# Patient Record
Sex: FEMALE | Race: BLACK OR AFRICAN AMERICAN | NOT HISPANIC OR LATINO | Employment: FULL TIME | URBAN - METROPOLITAN AREA
[De-identification: names, ages, dates, MRNs, and addresses within clinical notes are randomized per-mention and may not be internally consistent; named-entity substitution may affect disease eponyms.]

---

## 2017-03-27 ENCOUNTER — OFFICE VISIT (OUTPATIENT)
Dept: OBSTETRICS AND GYNECOLOGY | Facility: CLINIC | Age: 32
End: 2017-03-27
Payer: COMMERCIAL

## 2017-03-27 VITALS
WEIGHT: 284.38 LBS | BODY MASS INDEX: 38.52 KG/M2 | SYSTOLIC BLOOD PRESSURE: 122 MMHG | DIASTOLIC BLOOD PRESSURE: 78 MMHG | HEIGHT: 72 IN

## 2017-03-27 DIAGNOSIS — Z01.419 ENCOUNTER FOR GYNECOLOGICAL EXAMINATION WITHOUT ABNORMAL FINDING: ICD-10-CM

## 2017-03-27 DIAGNOSIS — Z90.710 STATUS POST HYSTERECTOMY: ICD-10-CM

## 2017-03-27 DIAGNOSIS — N64.4 BREAST PAIN: ICD-10-CM

## 2017-03-27 DIAGNOSIS — Z00.00 ANNUAL PHYSICAL EXAM: Primary | ICD-10-CM

## 2017-03-27 PROCEDURE — 99999 PR PBB SHADOW E&M-EST. PATIENT-LVL III: CPT | Mod: PBBFAC,,, | Performed by: OBSTETRICS & GYNECOLOGY

## 2017-03-27 PROCEDURE — 99395 PREV VISIT EST AGE 18-39: CPT | Mod: S$GLB,,, | Performed by: OBSTETRICS & GYNECOLOGY

## 2017-03-27 PROCEDURE — 1160F RVW MEDS BY RX/DR IN RCRD: CPT | Mod: S$GLB,,, | Performed by: OBSTETRICS & GYNECOLOGY

## 2017-03-27 NOTE — MR AVS SNAPSHOT
"    [facility]  [address]  [place] [address]  Phone: [phone]                  Scot Mcdonald   3/27/2017 1:15 PM   Office Visit    Description:  Female : 1985   Provider:  Livan Servin MD   Department[facility]  [facility]           Reason for Visit     Gynecologic Exam           Diagnoses this Visit        Comments    Annual physical exam    -  Primary     Encounter for gynecological examination without abnormal finding         Status post hysterectomy         Breast pain                To Do List           Goals (5 Years of Data)     None      Follow-Up and Disposition     Return in about 12 months (around 3/27/2018).      [facility]     [facility] On Call Nurse Care Line -  Assistance  Registered nurses in the [facility] On Call Center provide clinical advisement, health education, appointment booking, and other advisory services.  Call for this free service at [phone].             Medications                Verify that the below list of medications is an accurate representation of the medications you are currently taking.  If none reported, the list may be blank. If incorrect, please contact your healthcare provider. Carry this list with you in case of emergency.                Clinical Reference Information           Your Vitals Were     BP Height Weight Last Period BMI    122/78 6' 2" (1.88 m) 129 kg (284 lb 6.3 oz) 2013 36.51 kg/m2      Blood Pressure          Most Recent Value    BP  122/78      Allergies as of 3/27/2017     No Known Allergies      Immunizations Administered on Date of Encounter - 3/27/2017     None      Orders Placed During Today's Visit     Future Labs/Procedures Expected by Expires    Mammo Digital Screening Bilat with CAD  3/27/2017 2018      Instructions      Today's Plan:  Refer to your After Visit Summary for more information on your visit.       Talking to Your Healthcare Provider: Play an Active Role  To get the most " out of your healthcare, take an active role. This means thinking of every healthcare provider (HCP) as a partner in your care. Below are things you can do to help that partnership go smoothly.    Tell the truth.  To get the best possible care, you need to be honest with your healthcare provider.      Dont be afraid to talk about sensitive subjects.  Keep in mind that your HCP is used to talking about personal matters.    Stay focused.  Try not to think about what your HCP said 5 minutes ago or what you will say next.    Take notes.  When you write down what you hear, you listen more carefully. Dont write down every word your HCP says, just the main points.    Look at your HCP.  When you make eye contact, you show you are listening and you get more out of the discussion.    Repeat back.  Say something like What I hear you saying is... This shows you are listening and understanding, and gives your HCP a chance to correct any mistakes.     Keep all your appointments.  If you cant make it, call as soon as you know.    Be on time.  When a patient is late for an appointment, it can throw the HCP and his or her staff off schedule.    If you are having a medical test, follow your HCPs instructions.  For some medical tests, you need to fast (not eat or drink). For others, you need to take medication.    Do what your HCP asks.  Your HCP may give you instructions concerning things such as medication use, diet, or exercise.    If you cant follow your HCPs instructions, be sure to speak up.  Whether your medication makes you sick or you cant give up junk food, let your HCP know. Together, you may be able to solve the problem.    Talk about over-the-counter drugs.  If you are receiving treatment, ask your HCP which over-the-counter drugs you can take.    Take prescription drugs as instructed.  Dont take more or less than your doctor prescribed. Dont stop taking your medication without talking to your doctor  first.    Make sure your primary doctor is kept up-to-date.  If you are seeing a specialist, ask the specialist to let your primary doctor know how you are doing.    Ask your HCP how to contact him or her.  Find out if there are certain times during the day when your HCP takes phone calls. Ask who to call if you need an answer right away.    © 0712-8613 [employer], [address] [place] [address], [place] [address]. All rights reserved. This information is not intended as a substitute for professional medical care. Always follow your healthcare professional's instructions.      Thank you for your visit today. Please call our office if you can NOT make your future appointment.    If you are a smoker and would like to quit smoking and need help please call 9-618-QUIT-NOW ([phone])         Smoking Cessation     If you would like to quit smoking:   You may be eligible for free services if you are a [place] resident and started smoking cigarettes before September 1, 1988.  Call the [employer]oking [employer]essation [employer] (SCT) toll free at (380) 739-7403 or (620) 598-2814.   Call 1-571-QUIT-NOW if you do not meet the above criteria.            Language Assistance Services     ATTENTION: Language assistance services are available, free of charge. Please call [phone].      ATENCIÓN: Si terrencela phillip, tiene a lawrence disposición servicios gratuitos de asistencia lingüística. Llame al [phone].     CHÚ Ý: N?u b?n nói Ti?ng Vi?t, có các d?ch v? h? tr? ngôn ng? mi?n phí dành cho b?n. G?i s? [phone].         [facility] [place] [facility] complies with applicable Federal civil rights laws and does not discriminate on the basis of race, color, national origin, age, disability, or sex.

## 2017-03-27 NOTE — PATIENT INSTRUCTIONS

## 2017-03-27 NOTE — PROGRESS NOTES
Subjective:      Chief Complaint:  ANNUAL   EXAM  Chief Complaint   Patient presents with    Gynecologic Exam       Menstrual History:    OB History      Para Term  AB TAB SAB Ectopic Multiple Living    2         2          Menarche age: 11     Patient's last menstrual period was 2013.           Objective:        History of Present Illness AND  Examination detailed DICTATE:           HISTORY OF PRESENT ILLNESS:  The patient is 32 years of age,  2, para 2,   medical dysfunctional uterine bleeding, GERD syndrome, abnormal Pap in the past   procedure, vaginal hysterectomy, supracervical and two vaginal deliveries.  The   patient is complaining of occasional spotting, which she had it two years ago   and was cauterized with silver nitrate.  Also, complaining of breast pain, more   on the right, but is common equally.  At a time it is bad and mostly in the   lateral area of the breast.  That has been going on for about maybe two or three   months.  At a time, it is rather uncomfortable.    REVIEW OF SYSTEMS:  HEAD, EAR, EYES, NOSE, AND THROAT:  Negative.  CARDIORESPIRATORY:  Negative.  GASTROINTESTINAL:  Negative.  GENITOURINARY:  See present illness.  NEUROMUSCULAR:  No problem.    PHYSICAL EXAMINATION:  GENERAL:  Well-developed, well-nourished, alert, oriented female, not in acute   distress.  VITAL SIGNS:  Blood pressure 122/70, weight 284.  HEAD:  Normocephalic.  EYES:  Reactive.  NECK:  Supple.  Thyroid is not palpable.  No nodes.  CHEST:  Clear.  HEART:  Regular sinus rhythm, no murmur.  BREASTS:  Large dense breasts.  No mass, discharge, skin changes, retraction,   nipple changes, soreness to examination, discomfort when the lateral aspect of   the breast is touched.  Axilla is negative.  ABDOMEN:  Upper abdomen normal.  Lower abdomen normal.  No guarding, rebound   tenderness.  Bowel sounds normal.  PELVIC:  External normal.  Vulva normal.  Bartholin, urethral and [place]ore glands   are  negative.  Vagina is clear.  Cervix clear.  No bleeding.  The body of the   uterus is absent.  Both ovaries are palpable and negative.  No pain elicited on   examination.  Good motility.  Cauterized the cervix with silver nitrate again.  RECTAL:  Negative.  MUSCULOSKELETAL:  Negative.  NEUROLOGIC:  Grossly normal.    PLAN:  We will do a mammogram on the patient.  Pap smear in 2015 was negative.    RECOMMENDATION:  Continue with multivitamin, physical activity.  We will see the   patient back within a year.  If the mammogram is abnormal, then we will follow   up accordingly.      DENISHA  dd: 03/27/2017 13:45:38 (CDT)  td: 03/28/2017 05:15:39 (CDT)  Doc ID   #[id number]  Job ID #[id number]    CC:       Assessment:      Diagnosis: ANNUAL  EXAM   BREAST   PAIN       Plan:      Return in 12  months

## 2017-04-24 ENCOUNTER — HOSPITAL ENCOUNTER (OUTPATIENT)
Dept: RADIOLOGY | Facility: HOSPITAL | Age: 32
Discharge: HOME | End: 2017-04-24
Attending: OBSTETRICS & GYNECOLOGY
Payer: COMMERCIAL

## 2017-04-24 DIAGNOSIS — N64.4 BREAST PAIN: ICD-10-CM

## 2017-04-24 PROCEDURE — 77063 BREAST TOMOSYNTHESIS BI: CPT | Mod: 26,,, | Performed by: RADIOLOGY

## 2017-04-24 PROCEDURE — 77067 SCR MAMMO BI INCL CAD: CPT | Mod: TC

## 2017-04-24 PROCEDURE — 77067 SCR MAMMO BI INCL CAD: CPT | Mod: 26,,, | Performed by: RADIOLOGY

## 2017-06-15 ENCOUNTER — OFFICE VISIT (OUTPATIENT)
Dept: SPINE | Facility: CLINIC | Age: 32
End: 2017-06-15
Attending: ANESTHESIOLOGY
Payer: COMMERCIAL

## 2017-06-15 VITALS
HEART RATE: 80 BPM | SYSTOLIC BLOOD PRESSURE: 150 MMHG | BODY MASS INDEX: 38.47 KG/M2 | HEIGHT: 72 IN | WEIGHT: 284 LBS | DIASTOLIC BLOOD PRESSURE: 91 MMHG

## 2017-06-15 DIAGNOSIS — M47.819 SPONDYLOSIS WITHOUT MYELOPATHY: ICD-10-CM

## 2017-06-15 DIAGNOSIS — M60.9 MYOSITIS, UNSPECIFIED MYOSITIS TYPE, UNSPECIFIED SITE: ICD-10-CM

## 2017-06-15 DIAGNOSIS — M47.819 ARTHROPATHY OF FACET JOINTS AT MULTIPLE LEVELS: Primary | ICD-10-CM

## 2017-06-15 DIAGNOSIS — M46.1 SACROILIITIS: ICD-10-CM

## 2017-06-15 DIAGNOSIS — M51.36 DDD (DEGENERATIVE DISC DISEASE), LUMBAR: ICD-10-CM

## 2017-06-15 PROCEDURE — 99214 OFFICE O/P EST MOD 30 MIN: CPT | Mod: S$GLB,,, | Performed by: ANESTHESIOLOGY

## 2017-06-15 PROCEDURE — 99999 PR PBB SHADOW E&M-EST. PATIENT-LVL III: CPT | Mod: PBBFAC,,, | Performed by: ANESTHESIOLOGY

## 2017-06-15 NOTE — PROGRESS NOTES
Chronic patient Established Note (Follow up visit)      SUBJECTIVE:    Scot Mcdonald presents to the clinic for a follow-up appointment for lower back pain.   HPI: 31 yo female presents to clinic with Right lower back pain that radiates to her the middle of her Right thigh. Pt had bilateral L4-L5 TF CARLOS 4/8/2016 and states she had no relief following the procedure. The pain is 10/10 at its worst, and 0/10 at the least. It is sharp in nature, constant, and a burning sensation. She denies having any altered sensation. The pain occurs 3-4 times per week, and lasts for a few hours at a time. The pain is somewhat relieved by Alleve (200mg x4). Pt denies any movement that brings the pain on. She did not go to physical therapy, instead she joined a gym and walks on the treadmill with some strength training on weight machines. She sees Jeanine Méndez for Left knee pain associated with swelling and crepitus, likely arthritis. Patient sleeps 4 hours per night, this is unrelated to her back pain. She denies any urinary or bowel incontinence, fevers, or recent weight changes.     Pain Disability Index Review:  Last 3 PDI Scores 8/1/2016 6/2/2016   Pain Disability Index (PDI) 0 0       Pain Medications:     - Others: N/A     Opioid Contract: no      report:  Reviewed and consistent with medication use as prescribed.     Pain Procedures:   B/L L4-L5 TF CARLOS 4/8/2016      Physical Therapy/Home Exercise: yes     Imaging:   MRI Lower Extremity Joint WO Cont Left      Narrative   MRI of the left knee without contrast.    Technique: The following sequences were obtained: Localizer; coronal PD FS and T2 FS; sagittal T1, PD FS, T2 FS; axial T2 FS.    Comparison:  Radiographs 2/4/16.    Findings:     Menisci:  There is no tear of the medial or lateral meniscus.     Ligaments:  ACL, PCL, MCL, and LCL complex are intact.    Tendons:  There is patella aparna.  There is moderate lateral patellar subluxation with moderate edema within the  superolateral aspect of Hoffa fat pad.  There is fusiform thickening of the patellar tendon without definite partial or full thickness tear.  There is lateralization of the tibial tubercle with TT-TG distance of 1.7 cm.  No MR imaging findings to suggest trochlear dysplasia.    Cartilage:   Patellofemoral: There is moderate generalized articular cartilage thinning with prominent areas of full-thickness cartilage loss with prominent subchondral edema and cystic changes at the patellar eminence, lateral patellar facet and lateral trochlear  Medial tibiofemoral: Articular cartilage is maintained.  Lateral tibiofemoral: Articular cartilage is maintained.    Bone: There is mild patellofemoral osteophyte production. No marrow signal abnormality to suggest an infiltrative process.  No fractures.    Miscellaneous: There is a small volume of suprapatellar fluid. No Baker cyst.   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    Severe patellar tendinosis without definite partial or full-thickness tear.  ______________________________________     Electronically signed by resident: Humberto Hidalgo MD  Date: 03/02/16  Time: 08:22          As the supervising and teaching physician, I personally reviewed the images and resident's interpretation and I agree with the findings.        Electronically signed by: DEEPTHI DE LA CRUZ MD  Date: 03/02/16  Time: 11:14     Encounter   View Encounter      Reviewed By   Jeanine Vinson PA-C on 3/3/2016  5:10 PM         MRI Lumbar Spine Without Contrast      Narrative   Technique: Routine lumbar spine MRI performed without contrast.    Comparison: Radiograph 1/28/16.    Findings:    For the purposes of this exam, T12 demonstrates rudimentary ribs and there is partial sacralization of L5.    Lumbar spine alignment is normal. No spondylolyses or spondylolisthesis. Vertebral body heights  alignment and without evidence for fracture.  Marrow signal is normal without findings to suggest an infiltrative process.    Visualized spinal cord is normal in signal and contour.  The cauda equina is unremarkable without findings to suggest arachnoiditis.  The conus medullaris terminates at the L1 level.      The visualized abdominal and pelvic structures are unremarkable.  Paraspinal musculature demonstrates normal signal intensity.  Subcutaneous soft tissues are normal.    T12-L1 through L2-L3: No significant intervertebral disk abnormalities.  Facet joints are well maintained.  No spinal canal stenosis or neural foraminal narrowing.    L3-L4: No significant intervertebral disk abnormalities.  There is mild left-sided facet arthropathy.  No spinal canal stenosis or neural foraminal narrowing.      L4-L5: There is moderate disk desiccation with a small circumferential bulge the demonstrates a posterior annular fissure.  There is mild bilateral facet arthropathy.  Findings contribute to mild bilateral neural foraminal narrowing.  No spinal canal stenosis.    L5-S1: No spinal canal stenosis or neuroforaminal narrowing.   Impression       Moderate degenerative disk disease at L4-L5 noting a moderate circumferential bulge and posterior annular fissure.  Associated mild bilateral facet arthropathy contributes to mild bilateral neural foraminal narrowing. No spinal canal stenosis.    Sacralization of L5.  ______________________________________     Electronically signed by resident: Humberto Hidalgo MD  Date: 03/02/16  Time: 08:43          As the supervising and teaching physician, I personally reviewed the images and resident's interpretation and I agree with the findings.        Electronically signed by: DEEPTHI DE LA CRUZ MD  Date: 03/02/16  Time: 12:08       Allergies: Review of patient's allergies indicates:  No Known Allergies    Current Medications:   No current outpatient prescriptions on file.     No current  facility-administered medications for this visit.        REVIEW OF SYSTEMS:    GENERAL:  No weight loss, malaise or fevers.  HEENT:  Negative for frequent or significant headaches.  NECK:  Negative for lumps, goiter, pain and significant neck swelling.  RESPIRATORY:  Negative for cough, wheezing or shortness of breath.  CARDIOVASCULAR:  Negative for chest pain or palpitations. Left Knee swelling  GI:  Negative for abdominal discomfort or change in bowel habits.  MUSCULOSKELETAL:  See HPI.  SKIN:  Negative for lesions or rashes.  PSYCH:  + for sleep disturbance or mood disorder.  HEMATOLOGY/LYMPHOLOGY:  Negative for prolonged bleeding, bruising easily or swollen nodes.  NEURO:   No history of headaches or syncope.  All other reviewed and negative other than HPI.    Past Medical History:  Past Medical History:   Diagnosis Date    Abnormal Pap smear     Acid reflux     DUB (dysfunctional uterine bleeding)        Past Surgical History:  Past Surgical History:   Procedure Laterality Date    HYSTERECTOMY      VAGINAL DELIVERY      x2 normal       Family History:  Family History   Problem Relation Age of Onset    Eclampsia Father     Eclampsia Mother        Social History:  Social History     Social History    Marital status: Single     Spouse name: N/A    Number of children: N/A    Years of education: N/A     Social History Main Topics    Smoking status: Light Tobacco Smoker    Smokeless tobacco: Never Used    Alcohol use No    Drug use: Unknown    Sexual activity: Yes     Partners: Male     Other Topics Concern    Not on file     Social History Narrative    No narrative on file       OBJECTIVE:    LMP 08/13/2013     PHYSICAL EXAMINATION:    General appearance: Well appearing, in no acute distress.  Psych:  Mood and affect appropriate.  Skin: Skin color, texture, turgor normal, no rashes or lesions, in both upper and lower body.  Head/face:  Atraumatic, normocephalic. No palpable lymph nodes  Neck: No  pain to palpation over the cervical paraspinous muscles. No pain with neck flexion, extension, or lateral flexion. .  GI: Abdomen soft and non-tender.  Back: Straight leg raising in the sitting and supine positions is negative to radicular pain. moderaqte pain to palpation over the spine or costovertebral angles. Normal range of motion without pain reproduction. Positive axial loading test bilateral. Positive tenderness over both SIJ, Positive FABERE,Ganselin and Yeoman's test on the both side.negative FADIR  Extremities: Peripheral joint ROM is full and pain free without obvious instability or laxity in all four extremities. No deformities, edema, or skin discoloration.   Musculoskeletal: Shoulder, hip and knee provocative maneuvers are negative. Bilateral upper and lower extremity strength is normal and symmetric.  No atrophy or tone abnormalities are noted.   Neuro: Bilateral upper and lower extremity coordination and muscle stretch reflexes are physiologic and symmetric.   No loss of sensation is noted.  Gait: Normal.    ASSESSMENT: 32 y.o. year old female with low back pain, consistent with      1. 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 Spondylosis without myelopathy     3. DDD (degenerative disc disease), lumbar     4. Sacroiliitis     5. Myositis, unspecified myositis type, unspecified site           PLAN:     - I have stressed the importance of physical activity and a home exercise plan to help with pain and improve health.  - Patient can continue with medications for now since they are providing benefits, using them appropriately, and without side effects.  - Schedule for a Left/ Right L4-5&L5-S1 facet joint injections to help with his pain and progress with a home exercise plan.  - RTC 4 weeks  - Counseled patient regarding the importance of activity modification, constant sleeping habits and physical therapy.    The above plan and management options were discussed at length with patient.  Patient is in agreement with the above and verbalized understanding.    Daryl Baer MD    06/15/2017

## 2017-06-26 PROBLEM — Z00.00 ANNUAL PHYSICAL EXAM: Status: RESOLVED | Noted: 2017-03-27 | Resolved: 2017-06-26

## 2017-06-27 ENCOUNTER — TELEPHONE (OUTPATIENT)
Dept: PAIN MEDICINE | Facility: CLINIC | Age: 32
End: 2017-06-27

## 2017-06-27 DIAGNOSIS — M47.816 FACET HYPERTROPHY OF LUMBAR REGION: ICD-10-CM

## 2017-06-27 DIAGNOSIS — M47.816 FACET ARTHROPATHY, LUMBAR: Primary | ICD-10-CM

## 2017-06-27 NOTE — TELEPHONE ENCOUNTER
----- Message from Gilda Stroud sent at 6/27/2017  9:57 AM CDT -----  Please schedule Bilateral L4-L5 and L5-S1 Facet Steroid Injection w/ sedation with Dr. Baer, I'll fax over consent.

## 2017-06-27 NOTE — TELEPHONE ENCOUNTER
Contacted and spoke with [facility] regarding pt's pain creme. pharmacy did not receive RX. RX was filled out and faxed to [facility].

## 2017-06-27 NOTE — TELEPHONE ENCOUNTER
Spoke with patient regarding scheduling procedure. Procedure instructions for a Bilateral L4- L5 and L5-S1 facet steroid injection on 7/11/17 with the arrival time of 10 AM given. Patient verbalized understanding.

## 2017-06-27 NOTE — TELEPHONE ENCOUNTER
----- Message from Gilda Stroud sent at 6/27/2017 10:03 AM CDT -----  Per pt. She's waiting on call regarding pain cream.

## 2017-06-30 ENCOUNTER — TELEPHONE (OUTPATIENT)
Dept: PAIN MEDICINE | Facility: CLINIC | Age: 32
End: 2017-06-30

## 2017-06-30 NOTE — TELEPHONE ENCOUNTER
----- Message from Tereza Paola sent at 6/30/2017  9:05 AM CDT -----  Contact: self, [phone]  Patient requests to cancel her 7/11 procedure appointment, does not wish to reschedule. Please advise.

## 2017-07-10 ENCOUNTER — TELEPHONE (OUTPATIENT)
Dept: PAIN MEDICINE | Facility: CLINIC | Age: 32
End: 2017-07-10

## 2017-07-10 NOTE — TELEPHONE ENCOUNTER
Spoke with patient regarding time of arrival for procedure that is scheduled on 7/11/17. Patient verbalized that she would like to cancel her procedure.

## 2018-04-02 ENCOUNTER — OFFICE VISIT (OUTPATIENT)
Dept: OBSTETRICS AND GYNECOLOGY | Facility: CLINIC | Age: 33
End: 2018-04-02
Payer: COMMERCIAL

## 2018-04-02 VITALS
HEIGHT: 72 IN | DIASTOLIC BLOOD PRESSURE: 84 MMHG | BODY MASS INDEX: 39.68 KG/M2 | WEIGHT: 293 LBS | SYSTOLIC BLOOD PRESSURE: 126 MMHG

## 2018-04-02 DIAGNOSIS — Z01.419 ENCOUNTER FOR GYNECOLOGICAL EXAMINATION WITHOUT ABNORMAL FINDING: ICD-10-CM

## 2018-04-02 DIAGNOSIS — Z00.00 ANNUAL PHYSICAL EXAM: Primary | ICD-10-CM

## 2018-04-02 PROCEDURE — 99395 PREV VISIT EST AGE 18-39: CPT | Mod: S$GLB,,, | Performed by: OBSTETRICS & GYNECOLOGY

## 2018-04-02 PROCEDURE — 99999 PR PBB SHADOW E&M-EST. PATIENT-LVL III: CPT | Mod: PBBFAC,,, | Performed by: OBSTETRICS & GYNECOLOGY

## 2018-04-02 PROCEDURE — 88175 CYTOPATH C/V AUTO FLUID REDO: CPT

## 2018-04-02 RX ORDER — HYDROCHLOROTHIAZIDE 25 MG/1
TABLET ORAL
COMMUNITY
Start: 2018-03-12 | End: 2021-03-15

## 2018-04-02 RX ORDER — PROMETHAZINE HYDROCHLORIDE AND DEXTROMETHORPHAN HYDROBROMIDE 6.25; 15 MG/5ML; MG/5ML
SYRUP ORAL
COMMUNITY
Start: 2018-02-23 | End: 2018-04-02

## 2018-04-02 RX ORDER — AZITHROMYCIN 250 MG/1
TABLET, FILM COATED ORAL
COMMUNITY
Start: 2018-02-22 | End: 2018-04-02

## 2018-04-02 RX ORDER — GABAPENTIN 100 MG/1
CAPSULE ORAL
COMMUNITY
Start: 2018-03-12 | End: 2020-02-11

## 2018-04-02 RX ORDER — FLUCONAZOLE 150 MG/1
TABLET ORAL
COMMUNITY
Start: 2018-02-22 | End: 2018-04-02

## 2018-04-02 NOTE — PROGRESS NOTES
Subjective:      Chief Complaint:    Chief Complaint   Patient presents with    Annual Exam       Menstrual History:    OB History      Para Term  AB Living    2         2    SAB TAB Ectopic Multiple Live Births                       Menarche age: 11     Patient's last menstrual period was 08/15/2013.            Objective:        History of Present Illness AND  Examination detailed DICTATE:     HISTORY OF PRESENT ILLNESS:  The patient is 33 years of age, here for annual   exam,  2, para 2.  Mammogram in 2017, was negative.  Pap smear in ,   was normal.  The patient's medical, GERD and abnormal Pap.  Surgery, vaginal   hysterectomy, supracervical.  The patient has no problem or complaints.  Regular   cycles without any difficulty.    REVIEW OF SYSTEMS:  HEAD, EARS, EYES, NOSE AND THROAT:  Negative.  CARDIORESPIRATORY:  Negative.  GASTROINTESTINAL:  Negative.  GENITOURINARY:  See present illness.  NEUROMUSCULAR:  No problem or complaint.    PHYSICAL EXAMINATION:  GENERAL:  A well-developed, well-nourished, alert, oriented female, not in acute   distress.  VITAL SIGNS:  Blood pressure is 126/84 and weight is 319.  HEAD:  Normocephalic.  EYES:  Reactive.  NECK:  Supple.  Thyroid is not palpable.  No nodes.  CHEST:  Clear.  HEART:  Regular sinus rhythm.  No murmur.  BREASTS:  No lumps, masses, discharge, skin changes, retraction or nipple   changes.  Axillae are negative.  ABDOMEN:  Upper abdomen is normal.  Lower abdomen is normal.  No guarding,   rebound or tenderness.  Bowel sounds are normal.  PELVIC:  External is normal.  Vulva is normal.  Bartholin's, urethral and   [place]'s glands are negative.  Vagina is clear.  Cervix has some ectropion and   nabothian cyst.  Uterus is normal size, shape and position.  Both ovaries are   palpable.  Good pelvic support is noted.  No pain on exam.  RECTAL:  Negative.  MUSCULOSKELETAL:  Negative.  Normal range of motion.  SKIN:  Clear.  Blood vessels are  palpable.  Essentially normal exam.    PLAN:  Pap smear.  Continue with multivitamin.  Start calcium and vitamin D.    Increase physical activity and we will see her back within a year unless the Pap   smear is abnormal.      ALEC/IN  dd: 04/02/2018 13:34:50 (CDT)  td: 04/03/2018 10:55:38 (CDT)  Doc ID   #[id number]  Job ID #[id number]    CC:             Assessment:      Diagnosis: GYN   EXAM   ANNUAL   EXAM       Plan:      Return in 12  months

## 2018-04-02 NOTE — PATIENT INSTRUCTIONS

## 2018-04-02 NOTE — PROGRESS NOTES
Subjective:      Chief Complaint:    Chief Complaint   Patient presents with    Annual Exam       Menstrual History:    OB History      Para Term  AB Living    2         2    SAB TAB Ectopic Multiple Live Births                       Menarche age: 11     Patient's last menstrual period was 08/15/2013.            Objective:        History of Present Illness AND  Examination detailed DICTATE:             Assessment:      Diagnosis: ANNUAL       Plan:      Return in 12  months\

## 2018-04-03 NOTE — PROGRESS NOTES
HISTORY OF PRESENT ILLNESS:  The patient is 33 years of age, here for annual   exam,  2, para 2.  Mammogram in 2017 was negative.  Pap smear in 2015 was   normal.  The patient's medical, GERD and abnormal Pap.  Surgery, vaginal   hysterectomy, supracervical.  The patient has no problem or complaints.  Regular   cycles without any difficulty.    REVIEW OF SYSTEMS:  HEAD, EARS, EYES, NOSE, AND THROAT:  Negative.  CARDIORESPIRATORY:  Negative.  GASTROINTESTINAL:  Negative.  GENITOURINARY:  See present illness.  NEUROMUSCULAR:  No problem or complaint.    PHYSICAL EXAMINATION:  GENERAL:  A well-developed, well-nourished, alert, oriented female, not in acute   distress.  VITAL SIGNS:  Blood pressure is 126/84 and weight is 319.  HEAD:  Normocephalic.  EYES:  Reactive.  NECK:  Supple.  Thyroid is not palpable.  No nodes.  CHEST:  Clear.  HEART:  Regular sinus rhythm.  No murmur.  BREASTS:  No lumps, masses, discharge, skin changes, retraction, or nipple   changes.  Axillae are negative.  ABDOMEN:  Upper abdomen is normal.  Lower abdomen is normal.  No guarding,   rebound, or tenderness.  Bowel sounds are normal.  PELVIC:  External is normal.  Vulva is normal.  Bartholin's, urethral, and   [place]'s glands are negative.  Vagina is clear.  Cervix has some ectropion and   nabothian cyst.  Uterus is normal size, shape, and position.  Both ovaries are   palpable.  Good pelvic support is noted.  No pain on exam.  RECTAL:  Negative.  MUSCULOSKELETAL:  Negative.  Normal range of motion.  SKIN:  Clear.  Blood vessels are palpable.  Essentially normal exam.    PLAN:  Pap smear.  Continue with multivitamin.  Start calcium and vitamin D.    Increase physical activity and we will see her back within a year unless the Pap   smear is abnormal.      ALEC/IN  dd: 2018 13:34:50 (CDT)  td: 2018 10:55:38 (CDT)  Doc ID   #[id number]  Job ID #[id number]    CC:

## 2018-09-18 ENCOUNTER — OFFICE VISIT (OUTPATIENT)
Dept: OBSTETRICS AND GYNECOLOGY | Facility: CLINIC | Age: 33
End: 2018-09-18
Payer: COMMERCIAL

## 2018-09-18 VITALS
WEIGHT: 293 LBS | HEIGHT: 72 IN | SYSTOLIC BLOOD PRESSURE: 130 MMHG | DIASTOLIC BLOOD PRESSURE: 80 MMHG | BODY MASS INDEX: 39.68 KG/M2

## 2018-09-18 DIAGNOSIS — N93.9 ABNORMAL VAGINAL BLEEDING: ICD-10-CM

## 2018-09-18 DIAGNOSIS — Z90.710 STATUS POST HYSTERECTOMY: Primary | ICD-10-CM

## 2018-09-18 PROCEDURE — 99999 PR PBB SHADOW E&M-EST. PATIENT-LVL III: CPT | Mod: PBBFAC,,, | Performed by: OBSTETRICS & GYNECOLOGY

## 2018-09-18 PROCEDURE — 3008F BODY MASS INDEX DOCD: CPT | Mod: CPTII,S$GLB,, | Performed by: OBSTETRICS & GYNECOLOGY

## 2018-09-18 PROCEDURE — 99213 OFFICE O/P EST LOW 20 MIN: CPT | Mod: S$GLB,,, | Performed by: OBSTETRICS & GYNECOLOGY

## 2018-09-18 NOTE — PROGRESS NOTES
Subjective:      Chief Complaint:    Chief Complaint   Patient presents with    Well Woman       Menstrual History:    OB History      Para Term  AB Living    2         2    SAB TAB Ectopic Multiple Live Births                       Menarche age: 11     Patient's last menstrual period was 08/15/2013.            Objective:        History of Present Illness AND  Examination detailed DICTATE:   PROBLEM FOCUSED EXAM    HISTORY OF PRESENT ILLNESS:  The patient is 33 years of age.   2, para 2.    Mammogram in 2017, negative.  Pap smear this year is negative.  The patient   has GERD syndrome and hysterectomy, supracervical; complaining of spotting   occasionally.  This happened previously.    PHYSICAL EXAMINATION:  VITAL SIGNS:  Blood pressure 130/80, weight 289.  ABDOMEN:  Soft.  PELVIC:  External normal.  Vulva normal.  Bartholin, urethra and [place] negative.    Vagina is clear, very tiny minimal opening of the cervix, cauterized with   silver nitrate.  Body of uterus absent.  Both ovaries are palpable.  Bleeding is   most likely cervical, cauterized with silver nitrate.      PLAN:  We will see the patient back on her regular visit about a year.      ALEC/IN  dd: 2018 09:25:45 (CDT)  td: 2018 04:02:59 (CDT)  Doc ID   #[id number]  Job ID #[id number]    CC:               Assessment:      Diagnosis: VAG   BLEEDING       Plan:      Return in 12  months

## 2020-02-11 ENCOUNTER — OFFICE VISIT (OUTPATIENT)
Dept: OBSTETRICS AND GYNECOLOGY | Facility: CLINIC | Age: 35
End: 2020-02-11
Payer: COMMERCIAL

## 2020-02-11 VITALS
SYSTOLIC BLOOD PRESSURE: 130 MMHG | BODY MASS INDEX: 39.68 KG/M2 | HEIGHT: 72 IN | WEIGHT: 293 LBS | DIASTOLIC BLOOD PRESSURE: 84 MMHG

## 2020-02-11 DIAGNOSIS — Z01.419 WELL WOMAN EXAM WITH ROUTINE GYNECOLOGICAL EXAM: Primary | ICD-10-CM

## 2020-02-11 PROCEDURE — 88175 CYTOPATH C/V AUTO FLUID REDO: CPT

## 2020-02-11 PROCEDURE — 99395 PREV VISIT EST AGE 18-39: CPT | Mod: S$GLB,,, | Performed by: OBSTETRICS & GYNECOLOGY

## 2020-02-11 PROCEDURE — 99999 PR PBB SHADOW E&M-EST. PATIENT-LVL III: CPT | Mod: PBBFAC,,, | Performed by: OBSTETRICS & GYNECOLOGY

## 2020-02-11 PROCEDURE — 99999 PR PBB SHADOW E&M-EST. PATIENT-LVL III: ICD-10-PCS | Mod: PBBFAC,,, | Performed by: OBSTETRICS & GYNECOLOGY

## 2020-02-11 PROCEDURE — 99395 PR PREVENTIVE VISIT,EST,18-39: ICD-10-PCS | Mod: S$GLB,,, | Performed by: OBSTETRICS & GYNECOLOGY

## 2020-02-11 PROCEDURE — 87624 HPV HI-RISK TYP POOLED RSLT: CPT

## 2020-02-11 RX ORDER — LACTULOSE 10 G/15ML
SOLUTION ORAL; RECTAL
COMMUNITY
Start: 2020-01-27 | End: 2021-03-15

## 2020-02-11 NOTE — PROGRESS NOTES
Subjective:       Patient ID: Scot Mcdonald is a 35 y.o. female.    Chief Complaint:  Gynecologic Exam (Last pap was 18 S/P: SAH on 13)      History of Present Illness  HPI  Annual Exam-Premenopausal  Patient presents for annual exam. The patient has no complaints today. The patient is sexually active. GYN screening history: last pap: approximate date 2018 and was abnormal: no endocervical cells. The patient wears seatbelts: yes. The patient participates in regular exercise: no. Has the patient ever been transfused or tattooed?: yes. The patient reports that there is not domestic violence in her life.    Status post laparoscopic supracervical hysterectomy with morcellation on 2013    GYN & OB History  Patient's last menstrual period was 08/15/2013.   Date of Last Pap: 2018    OB History    Para Term  AB Living   2 2 2     2   SAB TAB Ectopic Multiple Live Births           2      # Outcome Date GA Lbr Damon/2nd Weight Sex Delivery Anes PTL Lv   2 Term 11/15/16   4.082 kg (9 lb) F Vag-Spont EPI  ABEL   1 Term 03   4.082 kg (9 lb) M Vag-Spont EPI N ABEL     Past Medical History:   Diagnosis Date    Abnormal Pap smear     Abnormal Pap smear of cervix     Acid reflux     DUB (dysfunctional uterine bleeding)        Past Surgical History:   Procedure Laterality Date    HYSTERECTOMY  2013    LSH    VAGINAL DELIVERY      x2 normal       Family History   Problem Relation Age of Onset    Diabetes Father     Hypertension Mother     Alzheimer's disease Paternal Grandfather     Hypertension Paternal Grandmother     Alzheimer's disease Maternal Grandmother        Social History     Socioeconomic History    Marital status: Single     Spouse name: Not on file    Number of children: Not on file    Years of education: Not on file    Highest education level: Not on file   Occupational History    Not on file   Social Needs    Financial resource strain: Not on file     Food insecurity:     Worry: Not on file     Inability: Not on file    Transportation needs:     Medical: Not on file     Non-medical: Not on file   Tobacco Use    Smoking status: Former Smoker    Smokeless tobacco: Never Used   Substance and Sexual Activity    Alcohol use: No     Alcohol/week: 0.0 standard drinks    Drug use: No    Sexual activity: Yes     Partners: Female   Lifestyle    Physical activity:     Days per week: Not on file     Minutes per session: Not on file    Stress: Not on file   Relationships    Social connections:     Talks on phone: Not on file     Gets together: Not on file     Attends Nondenominational service: Not on file     Active member of club or organization: Not on file     Attends meetings of clubs or organizations: Not on file     Relationship status: Not on file   Other Topics Concern    Not on file   Social History Narrative    Together since 4/2017    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        Review of patient's allergies indicates:  No Known Allergies    Review of Systems  Review of Systems   Constitutional: Negative for activity change, appetite change, chills, fatigue, fever and unexpected weight change.   HENT: Negative for mouth sores.    Respiratory: Negative for cough, shortness of breath and wheezing.    Cardiovascular: Negative for chest pain and palpitations.   Gastrointestinal: Negative for abdominal pain, bloating, blood in stool, constipation, nausea and vomiting.   Endocrine: Negative for diabetes and hot flashes.   Genitourinary: Negative for dysmenorrhea, dyspareunia, dysuria, frequency, hematuria, menorrhagia, menstrual problem, pelvic pain, urgency, vaginal bleeding, vaginal discharge, vaginal pain, urinary  incontinence, postcoital bleeding and vaginal odor.   Musculoskeletal: Negative for back pain and myalgias.   Integumentary:  Negative for rash, breast mass and nipple discharge.   Neurological: Negative for seizures and headaches.   Psychiatric/Behavioral: Negative for depression and sleep disturbance. The patient is not nervous/anxious.    Breast: Negative for mass, mastodynia and nipple discharge          Objective:    Physical Exam:   Constitutional: She appears well-developed and well-nourished. No distress.    HENT:   Head: Normocephalic and atraumatic.    Eyes: EOM are normal.    Neck: Normal range of motion.     Pulmonary/Chest: Effort normal. No respiratory distress.   Breasts: Non-tender, no engorgement, no masses, no retraction, no discharge. Negative for lymphadenopathy.         Abdominal: Soft. She exhibits no distension. There is no tenderness. There is no rebound and no guarding.   Trochar sites healed.     Genitourinary: Vagina normal. No vaginal discharge found.   Genitourinary Comments: Vulva without any obvious lesions.  Urethral meatus normal size and location without any lesion.  Urethra is non-tender without stricture or discharge.  Bladder is non-tender.  Vaginal vault with good support.  Minimal white discharge noted.  No obvious lesion.  Normal rugation.  Cervix is without any cervical motion tenderness.  No obvious lesion.  Uterus is surgically absent  Adnexa is without any masses or tenderness.   Right ovary prominent at around 4x4 cm.   Perineum without obvious lesion.             Musculoskeletal: Normal range of motion.       Neurological: She is alert.    Skin: Skin is warm and dry.    Psychiatric: She has a normal mood and affect.          Assessment:        1. Well woman exam with routine gynecological exam    2.  Status post LSH         Plan:          I have discussed with the patient her condition.  Monthly breast examination was instructed, discussed, and encouraged.  Patient was  encouraged to consume a low-calorie, low fat diet, and to increase of physical activity.  Healthy habits encouraged.  A Pap smear was performed with HR-HPV according to the USPSTF recommendations.  Mammogram was not ordered because of the combination of her age and risk factors, according to ACOG guidelines.  Gonorrhea and Chlamydia testing not performed;  HIV test not ordered, again according to guidelines.  We also discussed her obstetric history and CV risks.   Patient is to continue her medications as prescribed.   She will come back to see me in one year for her annual visit.  She can come back to see me sooner as necessary.  All of her questions were answered appropriately to her satisfaction.

## 2020-02-17 LAB
HPV HR 12 DNA SPEC QL NAA+PROBE: NEGATIVE
HPV16 AG SPEC QL: NEGATIVE
HPV18 DNA SPEC QL NAA+PROBE: NEGATIVE

## 2020-03-10 LAB
FINAL PATHOLOGIC DIAGNOSIS: NORMAL
Lab: NORMAL

## 2021-02-23 ENCOUNTER — TELEPHONE (OUTPATIENT)
Dept: OBSTETRICS AND GYNECOLOGY | Facility: CLINIC | Age: 36
End: 2021-02-23

## 2021-03-15 ENCOUNTER — OFFICE VISIT (OUTPATIENT)
Dept: OBSTETRICS AND GYNECOLOGY | Facility: CLINIC | Age: 36
End: 2021-03-15
Payer: COMMERCIAL

## 2021-03-15 VITALS
DIASTOLIC BLOOD PRESSURE: 68 MMHG | HEIGHT: 72 IN | WEIGHT: 264.56 LBS | SYSTOLIC BLOOD PRESSURE: 124 MMHG | BODY MASS INDEX: 35.83 KG/M2

## 2021-03-15 DIAGNOSIS — Z01.419 WELL WOMAN EXAM WITH ROUTINE GYNECOLOGICAL EXAM: Primary | ICD-10-CM

## 2021-03-15 PROCEDURE — 99999 PR PBB SHADOW E&M-EST. PATIENT-LVL III: CPT | Mod: PBBFAC,,, | Performed by: OBSTETRICS & GYNECOLOGY

## 2021-03-15 PROCEDURE — 1126F PR PAIN SEVERITY QUANTIFIED, NO PAIN PRESENT: ICD-10-PCS | Mod: S$GLB,,, | Performed by: OBSTETRICS & GYNECOLOGY

## 2021-03-15 PROCEDURE — 3008F BODY MASS INDEX DOCD: CPT | Mod: CPTII,S$GLB,, | Performed by: OBSTETRICS & GYNECOLOGY

## 2021-03-15 PROCEDURE — 99999 PR PBB SHADOW E&M-EST. PATIENT-LVL III: ICD-10-PCS | Mod: PBBFAC,,, | Performed by: OBSTETRICS & GYNECOLOGY

## 2021-03-15 PROCEDURE — 1126F AMNT PAIN NOTED NONE PRSNT: CPT | Mod: S$GLB,,, | Performed by: OBSTETRICS & GYNECOLOGY

## 2021-03-15 PROCEDURE — 3008F PR BODY MASS INDEX (BMI) DOCUMENTED: ICD-10-PCS | Mod: CPTII,S$GLB,, | Performed by: OBSTETRICS & GYNECOLOGY

## 2021-03-15 PROCEDURE — 99395 PR PREVENTIVE VISIT,EST,18-39: ICD-10-PCS | Mod: S$GLB,,, | Performed by: OBSTETRICS & GYNECOLOGY

## 2021-03-15 PROCEDURE — 99395 PREV VISIT EST AGE 18-39: CPT | Mod: S$GLB,,, | Performed by: OBSTETRICS & GYNECOLOGY

## 2021-03-15 RX ORDER — PANTOPRAZOLE SODIUM 40 MG/1
40 TABLET, DELAYED RELEASE ORAL DAILY
COMMUNITY
Start: 2021-03-06

## 2021-03-30 ENCOUNTER — IMMUNIZATION (OUTPATIENT)
Dept: PRIMARY CARE CLINIC | Facility: CLINIC | Age: 36
End: 2021-03-30
Payer: COMMERCIAL

## 2021-03-30 DIAGNOSIS — Z23 NEED FOR VACCINATION: Primary | ICD-10-CM

## 2021-03-30 PROCEDURE — 91301 PR SARS-COV-2 COVID-19 VACCINE, NO PRSV, 100MCG/0.5ML, IM: ICD-10-PCS | Mod: S$GLB,,, | Performed by: INTERNAL MEDICINE

## 2021-03-30 PROCEDURE — 0011A PR IMMUNIZ ADMIN, SARS-COV-2 COVID-19 VACC, 100MCG/0.5ML, 1ST DOSE: CPT | Mod: CV19,S$GLB,, | Performed by: INTERNAL MEDICINE

## 2021-03-30 PROCEDURE — 0011A PR IMMUNIZ ADMIN, SARS-COV-2 COVID-19 VACC, 100MCG/0.5ML, 1ST DOSE: ICD-10-PCS | Mod: CV19,S$GLB,, | Performed by: INTERNAL MEDICINE

## 2021-03-30 PROCEDURE — 91301 PR SARS-COV-2 COVID-19 VACCINE, NO PRSV, 100MCG/0.5ML, IM: CPT | Mod: S$GLB,,, | Performed by: INTERNAL MEDICINE

## 2021-03-30 RX ADMIN — Medication 0.5 ML: at 02:03

## 2021-07-23 PROCEDURE — 99284 EMERGENCY DEPT VISIT MOD MDM: CPT | Mod: 25

## 2021-07-23 PROCEDURE — 96372 THER/PROPH/DIAG INJ SC/IM: CPT

## 2021-07-24 ENCOUNTER — HOSPITAL ENCOUNTER (EMERGENCY)
Facility: HOSPITAL | Age: 36
Discharge: HOME | End: 2021-07-24
Attending: EMERGENCY MEDICINE
Payer: COMMERCIAL

## 2021-07-24 VITALS
HEART RATE: 78 BPM | HEIGHT: 72 IN | TEMPERATURE: 98 F | BODY MASS INDEX: 29.8 KG/M2 | OXYGEN SATURATION: 100 % | SYSTOLIC BLOOD PRESSURE: 118 MMHG | WEIGHT: 220 LBS | DIASTOLIC BLOOD PRESSURE: 76 MMHG | RESPIRATION RATE: 17 BRPM

## 2021-07-24 DIAGNOSIS — K59.00 CONSTIPATION, UNSPECIFIED CONSTIPATION TYPE: Primary | ICD-10-CM

## 2021-07-24 PROCEDURE — 63600175 PHARM REV CODE 636 W HCPCS: Performed by: PHYSICIAN ASSISTANT

## 2021-07-24 PROCEDURE — 25000003 PHARM REV CODE 250: Performed by: PHYSICIAN ASSISTANT

## 2021-07-24 RX ORDER — ONDANSETRON 4 MG/1
4 TABLET, ORALLY DISINTEGRATING ORAL
Status: COMPLETED | OUTPATIENT
Start: 2021-07-24 | End: 2021-07-24

## 2021-07-24 RX ORDER — POLYETHYLENE GLYCOL 3350 17 G/17G
17 POWDER, FOR SOLUTION ORAL DAILY
Qty: 14 EACH | Refills: 0 | Status: SHIPPED | OUTPATIENT
Start: 2021-07-24 | End: 2021-08-07

## 2021-07-24 RX ORDER — SYRING-NEEDL,DISP,INSUL,0.3 ML 29 G X1/2"
296 SYRINGE, EMPTY DISPOSABLE MISCELLANEOUS
Status: COMPLETED | OUTPATIENT
Start: 2021-07-24 | End: 2021-07-24

## 2021-07-24 RX ORDER — PSEUDOEPHEDRINE/ACETAMINOPHEN 30MG-500MG
100 TABLET ORAL
Status: COMPLETED | OUTPATIENT
Start: 2021-07-24 | End: 2021-07-24

## 2021-07-24 RX ORDER — MORPHINE SULFATE 4 MG/ML
4 INJECTION, SOLUTION INTRAMUSCULAR; INTRAVENOUS
Status: COMPLETED | OUTPATIENT
Start: 2021-07-24 | End: 2021-07-24

## 2021-07-24 RX ORDER — LIDOCAINE HYDROCHLORIDE 20 MG/ML
JELLY TOPICAL
Status: COMPLETED | OUTPATIENT
Start: 2021-07-24 | End: 2021-07-24

## 2021-07-24 RX ADMIN — SODIUM CHLORIDE 500 ML: 0.9 INJECTION, SOLUTION INTRAVENOUS at 04:07

## 2021-07-24 RX ADMIN — ONDANSETRON 4 MG: 4 TABLET, ORALLY DISINTEGRATING ORAL at 02:07

## 2021-07-24 RX ADMIN — MORPHINE SULFATE 4 MG: 4 INJECTION, SOLUTION INTRAMUSCULAR; INTRAVENOUS at 02:07

## 2021-07-24 RX ADMIN — LIDOCAINE HYDROCHLORIDE 10 ML: 20 JELLY TOPICAL at 04:07

## 2021-07-24 RX ADMIN — Medication 100 ML: at 04:07

## 2021-07-24 RX ADMIN — MAGNESIUM CITRATE 296 ML: 1.75 LIQUID ORAL at 04:07

## 2023-07-25 ENCOUNTER — OFFICE VISIT (OUTPATIENT)
Dept: OBSTETRICS AND GYNECOLOGY | Facility: CLINIC | Age: 38
End: 2023-07-25
Payer: MEDICAID

## 2023-07-25 VITALS
HEIGHT: 72 IN | BODY MASS INDEX: 25.08 KG/M2 | WEIGHT: 185.19 LBS | DIASTOLIC BLOOD PRESSURE: 70 MMHG | SYSTOLIC BLOOD PRESSURE: 130 MMHG

## 2023-07-25 DIAGNOSIS — Z12.31 SCREENING MAMMOGRAM, ENCOUNTER FOR: ICD-10-CM

## 2023-07-25 DIAGNOSIS — Z01.419 WELL WOMAN EXAM WITH ROUTINE GYNECOLOGICAL EXAM: Primary | ICD-10-CM

## 2023-07-25 PROCEDURE — 1160F RVW MEDS BY RX/DR IN RCRD: CPT | Mod: CPTII,,, | Performed by: OBSTETRICS & GYNECOLOGY

## 2023-07-25 PROCEDURE — 99999 PR PBB SHADOW E&M-EST. PATIENT-LVL III: CPT | Mod: PBBFAC,,, | Performed by: OBSTETRICS & GYNECOLOGY

## 2023-07-25 PROCEDURE — 87624 HPV HI-RISK TYP POOLED RSLT: CPT | Performed by: OBSTETRICS & GYNECOLOGY

## 2023-07-25 PROCEDURE — 88175 CYTOPATH C/V AUTO FLUID REDO: CPT | Performed by: OBSTETRICS & GYNECOLOGY

## 2023-07-25 PROCEDURE — 99213 OFFICE O/P EST LOW 20 MIN: CPT | Mod: PBBFAC | Performed by: OBSTETRICS & GYNECOLOGY

## 2023-07-25 PROCEDURE — 3075F PR MOST RECENT SYSTOLIC BLOOD PRESS GE 130-139MM HG: ICD-10-PCS | Mod: CPTII,,, | Performed by: OBSTETRICS & GYNECOLOGY

## 2023-07-25 PROCEDURE — 3008F BODY MASS INDEX DOCD: CPT | Mod: CPTII,,, | Performed by: OBSTETRICS & GYNECOLOGY

## 2023-07-25 PROCEDURE — 1159F MED LIST DOCD IN RCRD: CPT | Mod: CPTII,,, | Performed by: OBSTETRICS & GYNECOLOGY

## 2023-07-25 PROCEDURE — 1160F PR REVIEW ALL MEDS BY PRESCRIBER/CLIN PHARMACIST DOCUMENTED: ICD-10-PCS | Mod: CPTII,,, | Performed by: OBSTETRICS & GYNECOLOGY

## 2023-07-25 PROCEDURE — 1159F PR MEDICATION LIST DOCUMENTED IN MEDICAL RECORD: ICD-10-PCS | Mod: CPTII,,, | Performed by: OBSTETRICS & GYNECOLOGY

## 2023-07-25 PROCEDURE — 99396 PR PREVENTIVE VISIT,EST,40-64: ICD-10-PCS | Mod: S$PBB,,, | Performed by: OBSTETRICS & GYNECOLOGY

## 2023-07-25 PROCEDURE — 99396 PREV VISIT EST AGE 40-64: CPT | Mod: S$PBB,,, | Performed by: OBSTETRICS & GYNECOLOGY

## 2023-07-25 PROCEDURE — 99999 PR PBB SHADOW E&M-EST. PATIENT-LVL III: ICD-10-PCS | Mod: PBBFAC,,, | Performed by: OBSTETRICS & GYNECOLOGY

## 2023-07-25 PROCEDURE — 3078F PR MOST RECENT DIASTOLIC BLOOD PRESSURE < 80 MM HG: ICD-10-PCS | Mod: CPTII,,, | Performed by: OBSTETRICS & GYNECOLOGY

## 2023-07-25 PROCEDURE — 3078F DIAST BP <80 MM HG: CPT | Mod: CPTII,,, | Performed by: OBSTETRICS & GYNECOLOGY

## 2023-07-25 PROCEDURE — 3075F SYST BP GE 130 - 139MM HG: CPT | Mod: CPTII,,, | Performed by: OBSTETRICS & GYNECOLOGY

## 2023-07-25 PROCEDURE — 3008F PR BODY MASS INDEX (BMI) DOCUMENTED: ICD-10-PCS | Mod: CPTII,,, | Performed by: OBSTETRICS & GYNECOLOGY

## 2023-07-25 RX ORDER — BUTALBITAL, ACETAMINOPHEN AND CAFFEINE 50; 325; 40 MG/1; MG/1; MG/1
1 TABLET ORAL EVERY 6 HOURS PRN
COMMUNITY
Start: 2023-06-20

## 2023-07-25 RX ORDER — TRAZODONE HYDROCHLORIDE 50 MG/1
50 TABLET ORAL NIGHTLY
COMMUNITY
Start: 2023-07-17

## 2023-07-25 RX ORDER — ERGOCALCIFEROL 1.25 MG/1
50000 CAPSULE ORAL
COMMUNITY
Start: 2023-07-14

## 2023-07-25 RX ORDER — LANOLIN ALCOHOL/MO/W.PET/CERES
100 CREAM (GRAM) TOPICAL
COMMUNITY
Start: 2023-05-20

## 2023-07-25 NOTE — PROGRESS NOTES
Subjective:       Patient ID: Scot Mcdonald is a 38 y.o. female.    Chief Complaint:  Well Woman (Last normal pap with Negative HPV was 2020 and last normal mammogram was 2017. Pt had an LSH 13)      History of Present Illness  HPI  Annual Exam-Premenopausal  Patient presents for annual exam. The patient has no complaints today. The patient is sexually active. GYN screening history: last pap: approximate date 2020 and was normal. Last normal mammogram on 17.  The patient wears seatbelts: yes. The patient participates in regular exercise: yes. Has the patient ever been transfused or tattooed?:  No/Yes . The patient reports that there is not domestic violence in her life.    Status post laparoscopic supracervical hysterectomy by Dr. Servin in  for dysmenorrhea  Status post gastric bypass this 2021.        GYN & OB History  Patient's last menstrual period was 08/15/2013.   Date of Last Pap: 3/10/2020    OB History    Para Term  AB Living   2 2 2     2   SAB IAB Ectopic Multiple Live Births           2      # Outcome Date GA Lbr Damon/2nd Weight Sex Delivery Anes PTL Lv   2 Term 11/15/16   4.082 kg (9 lb) F Vag-Spont EPI  ABEL   1 Term 03   4.082 kg (9 lb) M Vag-Spont EPI N ABEL     Past Medical History:   Diagnosis Date    Abnormal Pap smear     Abnormal Pap smear of cervix     Acid reflux     DUB (dysfunctional uterine bleeding)        Past Surgical History:   Procedure Laterality Date    GASTRIC BYPASS  2021    HYSTERECTOMY  2013    LSH    VAGINAL DELIVERY      x2 normal       Family History   Problem Relation Age of Onset    Diabetes Father     Hypertension Mother     Alzheimer's disease Paternal Grandfather     Hypertension Paternal Grandmother     Alzheimer's disease Maternal Grandmother        Social History     Socioeconomic History    Marital status: Single   Tobacco Use    Smoking status: Former    Smokeless tobacco: Never   Substance and Sexual  Activity    Alcohol use: No     Alcohol/week: 0.0 standard drinks    Drug use: No    Sexual activity: Yes     Partners: Female   Social History Narrative    Together since 4/2017    Female partner.    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      ergocalciferol (ERGOCALCIFEROL) 50,000 unit Cap Take 50,000 Units by mouth every 7 days.      pantoprazole (PROTONIX) 40 MG tablet Take 40 mg by mouth once daily.      thiamine 100 MG tablet Take 100 mg by mouth.      traZODone (DESYREL) 50 MG tablet Take 50 mg by mouth every evening.       No current facility-administered medications for this visit.       Review of patient's allergies indicates:  No Known Allergies      Review of Systems  Review of Systems   Constitutional:  Negative for activity change, appetite change, chills, fatigue, fever and unexpected weight change.   HENT:  Negative for mouth sores.    Respiratory:  Negative for cough, shortness of breath and wheezing.    Cardiovascular:  Negative for chest pain and palpitations.   Gastrointestinal:  Negative for abdominal pain, bloating, blood in stool, constipation, nausea and vomiting.   Endocrine: Negative for diabetes and hot flashes.   Genitourinary:  Negative for dysmenorrhea, dyspareunia, dysuria, frequency, hematuria, menorrhagia, menstrual problem, pelvic pain, urgency, vaginal bleeding, vaginal discharge, vaginal pain, urinary incontinence, postcoital bleeding and vaginal odor.   Musculoskeletal:  Negative for back pain and myalgias.   Integumentary:  Negative for rash, breast mass and nipple discharge.   Neurological:  Negative for seizures and headaches.   Psychiatric/Behavioral:  Negative for depression and sleep disturbance. The patient is not nervous/anxious.    Breast:  Negative for mass, mastodynia and nipple discharge        Objective:    Physical Exam:   Constitutional: She appears well-developed and well-nourished. No distress.   BMI of 23.78    HENT:   Head: Normocephalic and atraumatic.    Eyes: EOM are normal.      Pulmonary/Chest: Effort normal. No respiratory distress.   Breasts: Non-tender, no engorgement, no masses, no retraction, no discharge. Negative for lymphadenopathy.         Abdominal: Soft. She exhibits no distension. There is no abdominal tenderness. There is no rebound and no guarding.   Multiple trochar sites.     Genitourinary:    Vagina normal.   No  no vaginal discharge in the vagina.    Genitourinary Comments: Vulva without any obvious lesions.  Urethral meatus normal size and location without any lesion.  Urethra is non-tender without stricture or discharge.  Bladder is non-tender.  Vaginal vault with good support.  Minimal white discharge noted.  No obvious lesion.  Normal rugation.  Cervix is without any cervical motion tenderness.  No obvious lesion.  Uterus is surgically absent.  Adnexa is without any masses or tenderness.  Perineum without obvious lesion.               Musculoskeletal: Normal range of motion.       Neurological: She is alert.    Skin: Skin is warm and dry.    Psychiatric: She has a normal mood and affect.        Assessment:        1. Well woman exam with routine gynecological exam    2. Screening mammogram, encounter for    3.   Status post H           Plan:          I have discussed with the patient her condition.  Monthly breast examination was instructed, discussed, and encouraged.  Patient was encouraged to consume a low-calorie, low fat diet, and to increase of physical activity.  Healthy habits encouraged.  A Pap smear was performed with HRHPV according to the USPSTF recommendations.  Mammogram was ordered because of the combination of her age and risk factors, according to ACOG guidelines.  Gonorrhea and Chlamydia testing  not performed;  HIV test not offered, again according to guidelines.  Colonoscopy discussed according to ACS guideline.  We also discussed her obstetric history and CV risks.     Care Gaps addressed.    Patient is to continue her medications as prescribed.   She will come back to see me in one year for her annual visit.  She can come back to see me sooner as necessary.  All of her questions were answered appropriately to her satisfaction.

## 2023-07-27 ENCOUNTER — HOSPITAL ENCOUNTER (OUTPATIENT)
Dept: RADIOLOGY | Facility: HOSPITAL | Age: 38
Discharge: HOME | End: 2023-07-27
Attending: OBSTETRICS & GYNECOLOGY
Payer: MEDICAID

## 2023-07-27 DIAGNOSIS — Z12.31 SCREENING MAMMOGRAM, ENCOUNTER FOR: ICD-10-CM

## 2023-07-27 PROCEDURE — 77067 MAMMO DIGITAL SCREENING BILAT WITH TOMO: ICD-10-PCS | Mod: 26,,, | Performed by: RADIOLOGY

## 2023-07-27 PROCEDURE — 77063 BREAST TOMOSYNTHESIS BI: CPT | Mod: 26,,, | Performed by: RADIOLOGY

## 2023-07-27 PROCEDURE — 77067 SCR MAMMO BI INCL CAD: CPT | Mod: TC

## 2023-07-27 PROCEDURE — 77063 MAMMO DIGITAL SCREENING BILAT WITH TOMO: ICD-10-PCS | Mod: 26,,, | Performed by: RADIOLOGY

## 2023-07-27 PROCEDURE — 77067 SCR MAMMO BI INCL CAD: CPT | Mod: 26,,, | Performed by: RADIOLOGY

## 2023-07-30 LAB
FINAL PATHOLOGIC DIAGNOSIS: NORMAL
Lab: NORMAL

## 2024-07-30 ENCOUNTER — HOSPITAL ENCOUNTER (OUTPATIENT)
Dept: RADIOLOGY | Facility: HOSPITAL | Age: 39
Discharge: HOME | End: 2024-07-30
Attending: INTERNAL MEDICINE
Payer: COMMERCIAL

## 2024-07-30 DIAGNOSIS — Z12.31 ENCOUNTER FOR SCREENING MAMMOGRAM FOR BREAST CANCER: ICD-10-CM

## 2024-07-30 PROCEDURE — 77063 BREAST TOMOSYNTHESIS BI: CPT | Mod: 26,,, | Performed by: RADIOLOGY

## 2024-07-30 PROCEDURE — 77063 BREAST TOMOSYNTHESIS BI: CPT | Mod: TC

## 2024-07-30 PROCEDURE — 77067 SCR MAMMO BI INCL CAD: CPT | Mod: 26,,, | Performed by: RADIOLOGY

## 2024-08-05 ENCOUNTER — OFFICE VISIT (OUTPATIENT)
Dept: URGENT CARE | Facility: CLINIC | Age: 39
End: 2024-08-05
Payer: COMMERCIAL

## 2024-08-05 VITALS
BODY MASS INDEX: 25.06 KG/M2 | DIASTOLIC BLOOD PRESSURE: 87 MMHG | RESPIRATION RATE: 20 BRPM | SYSTOLIC BLOOD PRESSURE: 145 MMHG | OXYGEN SATURATION: 97 % | HEIGHT: 72 IN | WEIGHT: 185 LBS | HEART RATE: 78 BPM | TEMPERATURE: 98 F

## 2024-08-05 DIAGNOSIS — R51.9 ACUTE NONINTRACTABLE HEADACHE, UNSPECIFIED HEADACHE TYPE: Primary | ICD-10-CM

## 2024-08-05 DIAGNOSIS — R42 DIZZINESS: ICD-10-CM

## 2024-08-05 DIAGNOSIS — R51.9 CHRONIC NONINTRACTABLE HEADACHE, UNSPECIFIED HEADACHE TYPE: ICD-10-CM

## 2024-08-05 DIAGNOSIS — R09.81 NASAL CONGESTION: ICD-10-CM

## 2024-08-05 DIAGNOSIS — G89.29 CHRONIC NONINTRACTABLE HEADACHE, UNSPECIFIED HEADACHE TYPE: ICD-10-CM

## 2024-08-05 DIAGNOSIS — R04.0 BLEEDING FROM THE NOSE: ICD-10-CM

## 2024-08-05 PROBLEM — R41.840 DIFFICULTY CONCENTRATING: Status: ACTIVE | Noted: 2024-07-31

## 2024-08-05 PROBLEM — R73.02 IGT (IMPAIRED GLUCOSE TOLERANCE): Status: ACTIVE | Noted: 2019-07-13

## 2024-08-05 PROBLEM — E55.9 VITAMIN D DEFICIENCY: Status: ACTIVE | Noted: 2019-07-13

## 2024-08-05 PROBLEM — Z98.84 H/O GASTRIC BYPASS: Status: ACTIVE | Noted: 2022-11-21

## 2024-08-05 PROBLEM — M51.36 DEGENERATIVE DISC DISEASE, LUMBAR: Status: ACTIVE | Noted: 2022-11-21

## 2024-08-05 PROBLEM — M51.369 DEGENERATIVE DISC DISEASE, LUMBAR: Status: ACTIVE | Noted: 2022-11-21

## 2024-08-05 PROBLEM — N64.4 BREAST PAIN: Status: RESOLVED | Noted: 2017-03-27 | Resolved: 2024-08-05

## 2024-08-05 PROBLEM — Z90.710 STATUS POST HYSTERECTOMY: Status: RESOLVED | Noted: 2017-03-27 | Resolved: 2024-08-05

## 2024-08-05 PROBLEM — L73.2 HIDRADENITIS: Status: ACTIVE | Noted: 2021-04-20

## 2024-08-05 PROBLEM — I10 HYPERTENSION: Status: ACTIVE | Noted: 2020-10-13

## 2024-08-05 PROBLEM — K21.00 GASTROESOPHAGEAL REFLUX DISEASE WITH ESOPHAGITIS WITHOUT HEMORRHAGE: Status: ACTIVE | Noted: 2021-10-14

## 2024-08-05 LAB
CRP SERPL-MCNC: <0.3 MG/L (ref 0–8.2)
CTP QC/QA: YES
ERYTHROCYTE [SEDIMENTATION RATE] IN BLOOD BY PHOTOMETRIC METHOD: 8 MM/HR (ref 0–36)
SARS-COV-2 AG RESP QL IA.RAPID: NEGATIVE

## 2024-08-05 PROCEDURE — 87811 SARS-COV-2 COVID19 W/OPTIC: CPT | Mod: QW,,, | Performed by: FAMILY MEDICINE

## 2024-08-05 PROCEDURE — 85652 RBC SED RATE AUTOMATED: CPT | Performed by: FAMILY MEDICINE

## 2024-08-05 PROCEDURE — 86140 C-REACTIVE PROTEIN: CPT | Performed by: FAMILY MEDICINE

## 2024-08-05 PROCEDURE — 99214 OFFICE O/P EST MOD 30 MIN: CPT | Mod: ,,, | Performed by: FAMILY MEDICINE

## 2024-08-05 RX ORDER — FAMOTIDINE 20 MG/1
20 TABLET, FILM COATED ORAL 2 TIMES DAILY
COMMUNITY

## 2024-08-05 RX ORDER — FLUTICASONE PROPIONATE 50 MCG
SPRAY, SUSPENSION (ML) NASAL
COMMUNITY
Start: 2024-07-07 | End: 2024-08-07

## 2024-08-27 ENCOUNTER — OFFICE VISIT (OUTPATIENT)
Dept: OBSTETRICS AND GYNECOLOGY | Facility: CLINIC | Age: 39
End: 2024-08-27
Payer: COMMERCIAL

## 2024-08-27 VITALS
SYSTOLIC BLOOD PRESSURE: 120 MMHG | BODY MASS INDEX: 25.08 KG/M2 | HEIGHT: 72 IN | DIASTOLIC BLOOD PRESSURE: 76 MMHG | WEIGHT: 185.19 LBS

## 2024-08-27 DIAGNOSIS — Z01.419 WELL WOMAN EXAM WITH ROUTINE GYNECOLOGICAL EXAM: Primary | ICD-10-CM

## 2024-08-27 PROCEDURE — 3078F DIAST BP <80 MM HG: CPT | Mod: CPTII,S$GLB,, | Performed by: OBSTETRICS & GYNECOLOGY

## 2024-08-27 PROCEDURE — 99999 PR PBB SHADOW E&M-EST. PATIENT-LVL III: CPT | Mod: PBBFAC,,, | Performed by: OBSTETRICS & GYNECOLOGY

## 2024-08-27 PROCEDURE — 3008F BODY MASS INDEX DOCD: CPT | Mod: CPTII,S$GLB,, | Performed by: OBSTETRICS & GYNECOLOGY

## 2024-08-27 PROCEDURE — 99396 PREV VISIT EST AGE 40-64: CPT | Mod: S$GLB,,, | Performed by: OBSTETRICS & GYNECOLOGY

## 2024-08-27 PROCEDURE — 1159F MED LIST DOCD IN RCRD: CPT | Mod: CPTII,S$GLB,, | Performed by: OBSTETRICS & GYNECOLOGY

## 2024-08-27 PROCEDURE — 3074F SYST BP LT 130 MM HG: CPT | Mod: CPTII,S$GLB,, | Performed by: OBSTETRICS & GYNECOLOGY

## 2024-08-27 PROCEDURE — 1160F RVW MEDS BY RX/DR IN RCRD: CPT | Mod: CPTII,S$GLB,, | Performed by: OBSTETRICS & GYNECOLOGY

## 2024-08-27 RX ORDER — TRAZODONE HYDROCHLORIDE 150 MG/1
150 TABLET ORAL NIGHTLY
COMMUNITY
Start: 2024-08-15

## 2024-08-27 RX ORDER — LACTULOSE 10 G/15ML
SOLUTION ORAL; RECTAL
COMMUNITY

## 2024-08-27 RX ORDER — BUPROPION HYDROCHLORIDE 150 MG/1
150 TABLET ORAL EVERY MORNING
COMMUNITY
Start: 2024-08-15

## 2024-08-27 NOTE — PROGRESS NOTES
Subjective:       Patient ID: Scot Mcdonald is a 39 y.o. female.    Chief Complaint:  Well Woman (Last normal pap with Negative HPV was 2023 and last normal mammogram was 2024.)      History of Present Illness  HPI  Annual Exam-Premenopausal  Patient presents for annual exam. The patient has no complaints today. The patient is sexually active. GYN screening history: last pap: approximate date 2023 and was normal. Last normal mammogram on 2024.  The patient wears seatbelts: yes. The patient participates in regular exercise: yes. Has the patient ever been transfused or tattooed?:  No/Yes . The patient reports that there is not domestic violence in her life.    Status post laparoscopic supracervical hysterectomy by Dr. Servin in  for dysmenorrhea  Status post gastric bypass this 2021.        GYN & OB History  Patient's last menstrual period was 08/15/2013.   Date of Last Pap: 2023    OB History    Para Term  AB Living   2 2 2     2   SAB IAB Ectopic Multiple Live Births           2      # Outcome Date GA Lbr Damon/2nd Weight Sex Type Anes PTL Lv   2 Term 11/15/16   4.082 kg (9 lb) F Vag-Spont EPI  ABEL   1 Term 03   4.082 kg (9 lb) M Vag-Spont EPI N ABEL     Past Medical History:   Diagnosis Date    Abnormal Pap smear     Abnormal Pap smear of cervix     Acid reflux     DUB (dysfunctional uterine bleeding)        Past Surgical History:   Procedure Laterality Date    GASTRIC BYPASS  2021    HYSTERECTOMY  2013    LSH    VAGINAL DELIVERY      x2 normal       Family History   Problem Relation Name Age of Onset    Hypertension Mother      Diabetes Father      Breast cancer Paternal Aunt      Alzheimer's disease Maternal Grandmother      Breast cancer Paternal Grandmother      Hypertension Paternal Grandmother      Alzheimer's disease Paternal Grandfather         Social History     Socioeconomic History    Marital status: Single   Tobacco Use    Smoking  status: Former    Smokeless tobacco: Never   Substance and Sexual Activity    Alcohol use: No     Alcohol/week: 0.0 standard drinks of alcohol    Drug use: No    Sexual activity: Yes     Partners: Female   Social History Narrative    Together since 4/2017    Female partner.    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      butalbital-acetaminophen-caffeine -40 mg (FIORICET, ESGIC) -40 mg per tablet Take 1 tablet by mouth every 6 (six) hours as needed.      ergocalciferol (ERGOCALCIFEROL) 50,000 unit Cap Take 50,000 Units by mouth every 7 days.      famotidine (PEPCID) 20 MG tablet Take 20 mg by mouth 2 (two) times daily.      lactulose (CHRONULAC) 10 gram/15 mL solution TAKE 1 TABLESPOONFUL (15ML'S) BY MOUTH THREE TIMES A DAY      traZODone (DESYREL) 150 MG tablet Take 150 mg by mouth every evening.       No current facility-administered medications for this visit.       Review of patient's allergies indicates:  No Known Allergies      Review of Systems  Review of Systems   Constitutional:  Negative for activity change, appetite change, chills, fatigue, fever and unexpected weight change.   HENT:  Negative for mouth sores.    Respiratory:  Negative for cough, shortness of breath and wheezing.    Cardiovascular:  Negative for chest pain and palpitations.   Gastrointestinal:  Negative for abdominal pain, bloating, blood in stool, constipation, nausea and vomiting.   Endocrine: Negative for diabetes and hot flashes.   Genitourinary:  Negative for dysmenorrhea, dyspareunia, dysuria, frequency, hematuria, menorrhagia, menstrual problem, pelvic pain, urgency, vaginal bleeding, vaginal discharge, vaginal pain, urinary incontinence, postcoital bleeding and vaginal odor.   Musculoskeletal:  Negative for back pain and myalgias.   Integumentary:   Negative for rash, breast mass and nipple discharge.   Neurological:  Negative for seizures and headaches.   Psychiatric/Behavioral:  Negative for depression and sleep disturbance. The patient is not nervous/anxious.    Breast: Negative for mass, mastodynia and nipple discharge          Objective:    Physical Exam:   Constitutional: She appears well-developed and well-nourished. No distress.   BMI of 23.78    HENT:   Head: Normocephalic and atraumatic.    Eyes: EOM are normal.      Pulmonary/Chest: Effort normal. No respiratory distress.   Breasts: Non-tender, no engorgement, no masses, no retraction, no discharge. Negative for lymphadenopathy.         Abdominal: Soft. She exhibits no distension. There is no abdominal tenderness. There is no rebound and no guarding.   Multiple trochar sites.     Genitourinary:    Vagina normal.   No vaginal discharge in the vagina.    Genitourinary Comments: Vulva without any obvious lesions.  Urethral meatus normal size and location without any lesion.  Urethra is non-tender without stricture or discharge.  Bladder is non-tender.  Vaginal vault with good support.  Minimal white discharge noted.  No obvious lesion.  Normal rugation.  Cervix is without any cervical motion tenderness.  No obvious lesion.  Uterus is surgically absent.  Adnexa is without any masses or tenderness.  Perineum without obvious lesion.               Musculoskeletal: Normal range of motion.       Neurological: She is alert.    Skin: Skin is warm and dry.    Psychiatric: She has a normal mood and affect.          Assessment:        1. Well woman exam with routine gynecological exam    3.   Status post LSH           Plan:          I have discussed with the patient her condition.  Monthly breast examination was instructed, discussed, and encouraged.  Patient was encouraged to consume a low-calorie, low fat diet, and to increase of physical activity.  Healthy habits encouraged.  A Pap smear was performed with  HRHPV according to the USPSTF recommendations.  Mammogram was ordered because of the combination of her age and risk factors, according to ACOG guidelines.  Gonorrhea and Chlamydia testing not performed;  HIV test not offered, again according to guidelines.  Colonoscopy discussed according to ACS guideline.  We also discussed her obstetric history and CV risks.     Care Gaps addressed.    Patient is to continue her medications as prescribed.   She will come back to see me in one year for her annual visit.  She can come back to see me sooner as necessary.  All of her questions were answered appropriately to her satisfaction.        ** A female chaperone, Kathy Adkins, was present for the pelvic exam

## 2024-10-31 ENCOUNTER — TELEPHONE (OUTPATIENT)
Dept: CARDIOLOGY | Facility: CLINIC | Age: 39
End: 2024-10-31
Payer: COMMERCIAL

## 2024-10-31 DIAGNOSIS — Z13.6 ENCOUNTER FOR SCREENING FOR CARDIOVASCULAR DISORDERS: Primary | ICD-10-CM

## 2024-11-05 ENCOUNTER — OFFICE VISIT (OUTPATIENT)
Dept: CARDIOLOGY | Facility: CLINIC | Age: 39
End: 2024-11-05
Payer: COMMERCIAL

## 2024-11-05 VITALS
RESPIRATION RATE: 18 BRPM | BODY MASS INDEX: 24.24 KG/M2 | OXYGEN SATURATION: 98 % | DIASTOLIC BLOOD PRESSURE: 78 MMHG | HEIGHT: 72 IN | SYSTOLIC BLOOD PRESSURE: 116 MMHG | WEIGHT: 179 LBS

## 2024-11-05 DIAGNOSIS — I10 HYPERTENSION, UNSPECIFIED TYPE: Primary | ICD-10-CM

## 2024-11-05 PROCEDURE — 3078F DIAST BP <80 MM HG: CPT | Mod: CPTII,S$GLB,, | Performed by: INTERNAL MEDICINE

## 2024-11-05 PROCEDURE — 99203 OFFICE O/P NEW LOW 30 MIN: CPT | Mod: S$GLB,,, | Performed by: INTERNAL MEDICINE

## 2024-11-05 PROCEDURE — 99999 PR PBB SHADOW E&M-EST. PATIENT-LVL III: CPT | Mod: PBBFAC,,, | Performed by: INTERNAL MEDICINE

## 2024-11-05 PROCEDURE — 3008F BODY MASS INDEX DOCD: CPT | Mod: CPTII,S$GLB,, | Performed by: INTERNAL MEDICINE

## 2024-11-05 PROCEDURE — 3074F SYST BP LT 130 MM HG: CPT | Mod: CPTII,S$GLB,, | Performed by: INTERNAL MEDICINE

## 2024-11-05 PROCEDURE — 1159F MED LIST DOCD IN RCRD: CPT | Mod: CPTII,S$GLB,, | Performed by: INTERNAL MEDICINE

## 2024-11-05 NOTE — PROGRESS NOTES
CARDIOVASCULAR PROGRESS NOTE    REASON FOR CONSULT:   Scot Mcdonald is a 39 y.o. female who presents cardiac well being.     HISTORY OF PRESENT ILLNESS:   She has past medical history significant for anxiety and GERD.    Her primary care physician defer to Cardiology for cardiac well being before starting a stimulant medication given her underlying attention deficit disorder and anxiety.    She does not have any specific cardiac complaints.  She denies getting chest pain, shortness of breath or palpitation during exertion.  Lower extremity edema.  Denies orthopnea or PND.    Denies smoking.  Occasional alcohol intake.    No family history of premature CAD.      PAST MEDICAL HISTORY:     Past Medical History:   Diagnosis Date    Abnormal Pap smear     Abnormal Pap smear of cervix     Acid reflux     DUB (dysfunctional uterine bleeding)        PAST SURGICAL HISTORY:     Past Surgical History:   Procedure Laterality Date    GASTRIC BYPASS  01/2021    HYSTERECTOMY  08/29/2013    [facility]    VAGINAL DELIVERY      x2 normal       ALLERGIES AND MEDICATION:   Review of patient's allergies indicates:  No Known Allergies     Medication List            Accurate as of November 5, 2024 11:47 AM. If you have any questions, ask your nurse or doctor.                CONTINUE taking these medications      buPROPion 150 MG TB24 tablet  Commonly known as: WELLBUTRIN XL     butalbital-acetaminophen-caffeine -40 mg -40 mg per tablet  Commonly known as: FIORICET, ESGIC     ergocalciferol 50,000 unit Cap  Commonly known as: ERGOCALCIFEROL     famotidine 20 MG tablet  Commonly known as: PEPCID     lactulose 10 gram/15 mL solution  Commonly known as: CHRONULAC     traZODone 150 MG tablet  Commonly known as: DESYREL              SOCIAL HISTORY:     Social History     Socioeconomic History    Marital status: Single   Tobacco Use    Smoking status: Former    Smokeless tobacco: Never   Substance and Sexual Activity    Alcohol use: No      "Alcohol/week: 0.0 standard drinks of alcohol    Drug use: No    Sexual activity: Yes     Partners: Female   Social History Narrative    Together since 4/2017    Female partner.    She is supervisor    Patient is a cook at [employer]        FAMILY HISTORY:     Family History   Problem Relation Name Age of Onset    Hypertension Mother      Diabetes Father      Breast cancer Paternal Aunt      Alzheimer's disease Maternal Grandmother      Breast cancer Paternal Grandmother      Hypertension Paternal Grandmother      Alzheimer's disease Paternal Grandfather         REVIEW OF SYSTEMS:   ROS    Constitution: Negative for chills, fever, weight gain and weight loss.   Eyes: Negative for blurry vision, visual changes    Cardiovascular: Negative for chest pain. Negative for claudication, dyspnea on exertion, leg swelling, orthopnea, palpitations, paroxysmal nocturnal dyspnea.   Respiratory: Negative for shortness of breath. Negative for cough.    Endocrine: Negative for heat or cold intolerance    Hematologic/Lymphatic: Negative for easy bruising or bleeding    Skin: Negative for color change and rash.   Musculoskeletal: Negative for neck pain, arthralgias, myalgias    Gastrointestinal: Negative for abdominal pain, nausea, vomiting, diarrhea  Neurological: Negative for dizziness, light-headedness and loss of balance.   Psychiatric/Behavioral: Negative for altered mental status.    PHYSICAL EXAM:     Vitals:    11/05/24 1135   BP: 116/78   Resp: 18    Body mass index is 22.98 kg/m².  Weight: 81.2 kg (179 lb 0.2 oz)   Height: 6' 2" (188 cm)     Gen: NAD  Head/Eyes/Ears/Nose: MMM, good dentition   Neck: No carotid bruits, no JVD  Lung: Clear to auscultation bilaterally, no wheezes/rales/ronchi, symmetrical lung expansion with inspiration  Heart: Normal S1/S2, regular rate and rhythm, no murmurs/rubs/gallops  Abdomen: Soft, NT/ND, no masses  Extremities: No lower extremity edema.  No wounds or other skin lesions  Skin: Normal color " and turgor. No wounds rashes, no petechia, no ecchymoses.   Neuro: AAOx3    DATA:     Laboratory:  CBC:        CHEMISTRIES:  Recent Labs   Lab 11/21/22  1431 08/02/24  1003   Glucose 96 88   Sodium 137 140   Potassium 4.0 3.5   Blood Urea Nitrogen 15 13   Creatinine 0.71 0.77   eGFR 111 99   Calcium 9.2 9.0       CARDIAC BIOMARKERS:        COAGS:        LIPIDS/LFTS:  Recent Labs   Lab 11/21/22  1431 08/02/24  1003   AST 18 12   ALT 17 11         CARDIAC DIAGNOSTICS:  :     EKG:  EKG done in the clinic today showed sinus arrhythmia, right atrial enlargement and no STT wave change    ECHO  NA    3.  STRESS TEST  NA    4.  CARDIAC CATHETERIZATION  NA    5.  IMAGING   NA    6. OTHERS  NA    ASSESSMENT:   Routine cardiology care    Patient does not have any cardiac issues.  She is hemodynamically stable.    PLAN:   We will get an echocardiogram to rule out any structural heart disease (EKG suggestive of right atrial enlargement)  No objection to start stimulant medication if deemed necessary by primary care physician.  There is no need to wait for echocardiogram result before starting the medication  Screening labs including A1c and lipid profile look excellent.  Follow with primary care physician    Follow up in 6 months    Ze Delacruz MD  [facility]

## 2024-11-27 ENCOUNTER — TELEPHONE (OUTPATIENT)
Dept: CARDIOLOGY | Facility: CLINIC | Age: 39
End: 2024-11-27
Payer: COMMERCIAL

## 2024-11-27 NOTE — TELEPHONE ENCOUNTER
I called patient because she wanted the EKG faxed over to [id number] but I explained that our EKG machine was not transmitting due to it not connecting to the network but that IT came yesterday to transmit it and that it will take up to 24 hours for all files to upload and that once it shows up on [facility] then I will print it out and fax it over for her. Patient verbalized understanding.